# Patient Record
Sex: FEMALE | Race: WHITE | NOT HISPANIC OR LATINO | Employment: OTHER | URBAN - METROPOLITAN AREA
[De-identification: names, ages, dates, MRNs, and addresses within clinical notes are randomized per-mention and may not be internally consistent; named-entity substitution may affect disease eponyms.]

---

## 2018-02-07 ENCOUNTER — OFFICE VISIT (OUTPATIENT)
Dept: URGENT CARE | Facility: CLINIC | Age: 48
End: 2018-02-07
Payer: COMMERCIAL

## 2018-02-07 VITALS
BODY MASS INDEX: 21.68 KG/M2 | TEMPERATURE: 98.6 F | OXYGEN SATURATION: 95 % | HEART RATE: 76 BPM | SYSTOLIC BLOOD PRESSURE: 112 MMHG | RESPIRATION RATE: 14 BRPM | HEIGHT: 64 IN | WEIGHT: 127 LBS | DIASTOLIC BLOOD PRESSURE: 82 MMHG

## 2018-02-07 DIAGNOSIS — H65.192 OTHER ACUTE NONSUPPURATIVE OTITIS MEDIA OF LEFT EAR, RECURRENCE NOT SPECIFIED: ICD-10-CM

## 2018-02-07 PROCEDURE — 99203 OFFICE O/P NEW LOW 30 MIN: CPT | Performed by: NURSE PRACTITIONER

## 2018-02-07 RX ORDER — AZITHROMYCIN 250 MG/1
TABLET, FILM COATED ORAL
Qty: 6 TAB | Refills: 0 | Status: SHIPPED | OUTPATIENT
Start: 2018-02-07

## 2018-02-08 ASSESSMENT — ENCOUNTER SYMPTOMS
HEADACHES: 1
FEVER: 0

## 2018-02-09 NOTE — PROGRESS NOTES
"Subjective:      Jose Ricks is a 47 y.o. female who presents with Sinus Problem and Otalgia            Otalgia    There is pain in the left ear. This is a new problem. Episode onset: Pt reports left ear pain for almost a month now. She has been evaluated by another provider and placed on antibiotics. She reports tonight those antibiotics did not agree with her stomach so she stopped them. The problem occurs constantly. The problem has been unchanged. There has been no fever. The pain is moderate. Associated symptoms include headaches (left sided). Pertinent negatives include no ear discharge. She has tried antibiotics and NSAIDs for the symptoms. The treatment provided mild relief.       Review of Systems   Constitutional: Negative for fever.   HENT: Positive for ear pain. Negative for ear discharge.    Neurological: Positive for headaches (left sided).   All other systems reviewed and are negative.    Past Medical History:   Diagnosis Date   • Chronic pain    • Fibromyalgia    • Fibromyalgia 1/19/2011   • Hypothyroid 1/19/2011   • Sinusitis 1/19/2011      Past Surgical History:   Procedure Laterality Date   • OTHER ORTHOPEDIC SURGERY      prosthetic jaw   • OTHER ORTHOPEDIC SURGERY      cervical surgery      Social History     Social History   • Marital status:      Spouse name: N/A   • Number of children: N/A   • Years of education: N/A     Occupational History   • Not on file.     Social History Main Topics   • Smoking status: Never Smoker   • Smokeless tobacco: Never Used   • Alcohol use No   • Drug use: No   • Sexual activity: Not on file     Other Topics Concern   • Not on file     Social History Narrative   • No narrative on file          Objective:     /82   Pulse 76   Temp 37 °C (98.6 °F)   Resp 14   Ht 1.626 m (5' 4\")   Wt 57.6 kg (127 lb)   SpO2 95%   BMI 21.80 kg/m²      Physical Exam   Constitutional: She is oriented to person, place, and time. Vital signs are normal. She " appears well-developed and well-nourished.   HENT:   Head: Normocephalic and atraumatic.   Right Ear: Tympanic membrane and external ear normal.   Left Ear: External ear normal. Tympanic membrane is bulging.   Nose: Nose normal.   Cloudy fluid present behind left TM   Eyes: EOM are normal. Pupils are equal, round, and reactive to light.   Neck: Normal range of motion.   Cardiovascular: Normal rate and regular rhythm.    Pulmonary/Chest: Effort normal.   Musculoskeletal: Normal range of motion.   Lymphadenopathy:     She has no cervical adenopathy.   Neurological: She is alert and oriented to person, place, and time.   Skin: Skin is warm and dry. Capillary refill takes less than 2 seconds.   Psychiatric: She has a normal mood and affect. Her behavior is normal. Thought content normal.   Vitals reviewed.              Assessment/Plan:     1. Other acute nonsuppurative otitis media of left ear, recurrence not specified  - azithromycin (ZITHROMAX) 250 MG Tab; Take 2 tabs PO on the first day, then one tab PO daily for 4 days  Dispense: 6 Tab; Refill: 0    Will change antibiotics  Daily flonase spray for at least one week  Add in daily Zyrtec for several weeks to help with congestion and encourage pressure relief from ear  Tylenol and Ibuprofen PRN pain  Follow up with ENT  Supportive care, differential diagnoses, and indications for immediate follow-up discussed with patient.    Pathogenesis of diagnosis discussed including typical length and natural progression.      Instructed to return to  or nearest emergency department if symptoms fail to improve, for any change in condition, further concerns, or new concerning symptoms.  Patient states understanding of the plan of care and discharge instructions.

## 2018-02-22 ENCOUNTER — HOSPITAL ENCOUNTER (OUTPATIENT)
Dept: RADIOLOGY | Facility: MEDICAL CENTER | Age: 48
End: 2018-02-22
Attending: ORTHOPAEDIC SURGERY
Payer: COMMERCIAL

## 2018-02-22 DIAGNOSIS — M85.89 OTHER SPECIFIED DISORDERS OF BONE DENSITY AND STRUCTURE, MULTIPLE SITES: ICD-10-CM

## 2018-02-22 DIAGNOSIS — M54.5 BILATERAL LOW BACK PAIN, UNSPECIFIED CHRONICITY, WITH SCIATICA PRESENCE UNSPECIFIED: ICD-10-CM

## 2018-02-22 PROCEDURE — 77080 DXA BONE DENSITY AXIAL: CPT

## 2018-04-02 ENCOUNTER — APPOINTMENT (OUTPATIENT)
Dept: RADIOLOGY | Facility: MEDICAL CENTER | Age: 48
End: 2018-04-02
Attending: OTOLARYNGOLOGY
Payer: COMMERCIAL

## 2018-04-03 ENCOUNTER — HOSPITAL ENCOUNTER (OUTPATIENT)
Dept: RADIOLOGY | Facility: MEDICAL CENTER | Age: 48
End: 2018-04-03
Attending: OTOLARYNGOLOGY
Payer: COMMERCIAL

## 2018-04-03 DIAGNOSIS — R51.9 NONINTRACTABLE HEADACHE, UNSPECIFIED CHRONICITY PATTERN, UNSPECIFIED HEADACHE TYPE: ICD-10-CM

## 2018-04-03 DIAGNOSIS — J31.0 CHRONIC RHINITIS, UNSPECIFIED TYPE: ICD-10-CM

## 2018-04-03 DIAGNOSIS — J01.01 ACUTE RECURRENT MAXILLARY SINUSITIS: ICD-10-CM

## 2018-04-03 DIAGNOSIS — G50.1 ATYPICAL FACE PAIN: ICD-10-CM

## 2018-04-03 PROCEDURE — 70486 CT MAXILLOFACIAL W/O DYE: CPT

## 2018-04-10 ENCOUNTER — HOSPITAL ENCOUNTER (OUTPATIENT)
Dept: RADIOLOGY | Facility: MEDICAL CENTER | Age: 48
End: 2018-04-10

## 2018-07-10 PROCEDURE — 99284 EMERGENCY DEPT VISIT MOD MDM: CPT

## 2018-07-10 ASSESSMENT — PAIN SCALES - GENERAL
PAINLEVEL_OUTOF10: 9
PAINLEVEL_OUTOF10: 9

## 2018-07-11 ENCOUNTER — APPOINTMENT (OUTPATIENT)
Dept: RADIOLOGY | Facility: MEDICAL CENTER | Age: 48
End: 2018-07-11
Attending: EMERGENCY MEDICINE
Payer: COMMERCIAL

## 2018-07-11 ENCOUNTER — HOSPITAL ENCOUNTER (EMERGENCY)
Facility: MEDICAL CENTER | Age: 48
End: 2018-07-11
Attending: EMERGENCY MEDICINE
Payer: COMMERCIAL

## 2018-07-11 VITALS
HEART RATE: 79 BPM | WEIGHT: 130.51 LBS | BODY MASS INDEX: 22.28 KG/M2 | SYSTOLIC BLOOD PRESSURE: 119 MMHG | DIASTOLIC BLOOD PRESSURE: 79 MMHG | RESPIRATION RATE: 17 BRPM | OXYGEN SATURATION: 97 % | TEMPERATURE: 98.8 F | HEIGHT: 64 IN

## 2018-07-11 DIAGNOSIS — R68.84 JAW PAIN: ICD-10-CM

## 2018-07-11 PROCEDURE — 700102 HCHG RX REV CODE 250 W/ 637 OVERRIDE(OP): Performed by: EMERGENCY MEDICINE

## 2018-07-11 PROCEDURE — 70355 PANORAMIC X-RAY OF JAWS: CPT

## 2018-07-11 PROCEDURE — A9270 NON-COVERED ITEM OR SERVICE: HCPCS | Performed by: EMERGENCY MEDICINE

## 2018-07-11 RX ORDER — NAPROXEN 500 MG/1
500 TABLET ORAL ONCE
Status: COMPLETED | OUTPATIENT
Start: 2018-07-11 | End: 2018-07-11

## 2018-07-11 RX ORDER — NAPROXEN 500 MG/1
500 TABLET ORAL 2 TIMES DAILY WITH MEALS
Qty: 15 TAB | Refills: 0 | Status: SHIPPED | OUTPATIENT
Start: 2018-07-11

## 2018-07-11 RX ADMIN — Medication 500 MG: at 02:07

## 2018-07-11 ASSESSMENT — PAIN SCALES - GENERAL
PAINLEVEL_OUTOF10: 6
PAINLEVEL_OUTOF10: 9

## 2018-07-11 NOTE — ED TRIAGE NOTES
Break RN: patient medicated. Discharged with prescription and instruction. Verbalized understanding.

## 2018-07-11 NOTE — DISCHARGE INSTRUCTIONS
Temporomandibular Joint Pain  Your exam shows that you have a problem with your temporomandibular joint (TMJ), the joint that moves when you open your mouth or chew food. TMJ problems can result from direct injuries, bite abnormalities, or tension states which cause you to grind or clench your teeth. Typical symptoms include pain around the joint, clicking, restricted movement, and headaches.  The TMJ is like any other joint in the body; when it is strained, it needs rest to repair itself. To keep the joint at rest it is important that you do not open your mouth wider than the width of your index finger. If you must yawn, be sure to support your chin with your hand so your mouth does not open wide. Eat a soft diet (nothing firmer than ground beef, no raw vegetables), do not chew gum and do not talk if it causes you pain.  Apply topical heat by using a warm, moist cloth placed in front of the ear for 15 to 20 minutes several times daily. Alternating heat and ice may give even more relief. Anti-inflammatory pain medicine and muscle relaxants can also be helpful. A dental orthotic or splint may be used for temporary relief. Long-term problems may require treatment for stress as well as braces or surgery. Please check with your doctor or dentist if your symptoms do not improve within one week.  Document Released: 01/25/2006 Document Revised: 03/11/2013 Document Reviewed: 12/18/2006  [employer]® Patient Information ©2013 [employer].

## 2018-07-11 NOTE — ED TRIAGE NOTES
"Jose Ricks  48 y.o. female  Chief Complaint   Patient presents with   • Jaw Pain     Pt. states left sided jaw pain for the past few months. Pt. states she is seen by Dr. West for this issue post face lift. Pt. states decreased movement in that area. Pt. states hx of prosthetic jaw bilaterally. Pt. states fever and chills. Pt. states she took Percocet approx 20 min ago for pain and fever.     /81   Pulse 77   Temp 37.1 °C (98.8 °F)   Resp 16   Ht 1.626 m (5' 4\")   Wt 59.2 kg (130 lb 8.2 oz)   SpO2 97%   BMI 22.40 kg/m²      Pt amb to triage with steady gait for above complaint. Pt. States she is on multiple steroids for her s/s. Pt. In mask as she is worried about immunosupression from steroid use. Pt. Has some sores and swelling to the left cheek/ jaw area.  Pt is alert and oriented, speaking in full sentences, follows commands and responds appropriately to questions. NAD. Resp are even and unlabored.  Pt placed in lobby. Pt educated on triage process. Pt encouraged to alert staff for any changes.  "

## 2018-07-11 NOTE — ED NOTES
Took vitals on pt again. Pt has no new complaints. Apologized for the long wait time and pt understood the process. Advised pt to notify staff for any changes.

## 2018-07-11 NOTE — ED PROVIDER NOTES
"CHIEF COMPLAINT  Chief Complaint   Patient presents with   • Jaw Pain     Pt. states left sided jaw pain for the past few months. Pt. states she is seen by Dr. West for this issue post face lift. Pt. states decreased movement in that area. Pt. states hx of prosthetic jaw bilaterally. Pt. states fever and chills. Pt. states she took Percocet approx 20 min ago for pain and fever.       HPI  Jose Ricks is a 48 y.o. female who presents with left-sided jaw pain is been going on for the past few weeks.  The patient has been having some rash in the left side of her face is been going on for the past few days and she was started on steroids yesterday and now the symptoms seem to have improved.  The patient says that she has pain when opening of her mouth.  She denies any drainage out of her cheek.  She also mentions P she has been having some pain in her urine on the left side as well.  She has no decreased hearing.  She has no difficulty eating.  She has some subjective fevers and chills.    REVIEW OF SYSTEMS  See HPI for further details. All other systems are negative.     PAST MEDICAL HISTORY   has a past medical history of Chronic pain; Fibromyalgia; Fibromyalgia (1/19/2011); Hypothyroid (1/19/2011); and Sinusitis (1/19/2011).    SOCIAL HISTORY  Social History     Social History Main Topics   • Smoking status: Never Smoker   • Smokeless tobacco: Never Used   • Alcohol use No   • Drug use: No   • Sexual activity: Not on file       SURGICAL HISTORY   has a past surgical history that includes other orthopedic surgery and other orthopedic surgery.    CURRENT MEDICATIONS  Home Medications    **Home medications have not yet been reviewed for this encounter**         ALLERGIES  Allergies   Allergen Reactions   • Flagyl [Metronidazole Hcl]    • Nkda [No Known Drug Allergy]    • Vancomycin        PHYSICAL EXAM  VITAL SIGNS: /81   Pulse 82   Temp 37.3 °C (99.2 °F)   Resp 18   Ht 1.626 m (5' 4\")   Wt 59.2 kg " (130 lb 8.2 oz)   SpO2 97%   BMI 22.40 kg/m²  @GERMAN[[id number]::@  Pulse ox interpretation: I interpret this pulse ox as normal.  Constitutional: Alert in no apparent distress.  HENT: Patient with some mild redness to the auricle on the left side.  The patient has a normal TM on the left.  The patient has some scattered areas of redness and scabs noted anterior to the left ear as well as posterior to it.  The patient is able to open and close her jaw without any difficulty.  The patient has no fluctuance or redness in her mouth.  Eyes: Pupils are equal and reactive. Conjunctiva normal, non-icteric.   Heart: Regular rate and rythm, no murmurs.    Lungs: Clear to auscultation bilaterally.  Skin: Warm, Dry, No erythema, No rash.   Neurologic: Alert, Grossly non-focal.   Psychiatric: Affect normal, Judgment normal, Mood normal, Appears appropriate and not intoxicated.     [id number]   Final Result         1.  No acute traumatic bony injury identified.                COURSE & MEDICAL DECISION MAKING  Pertinent Labs & Imaging studies reviewed. (See chart for details)    48-year-old female who presents with left-sided jaw pain.  I will order a Panorex to evaluate for dislocation versus osteomyelitis.  In addition I believe this is likely related to allergies given that the patient is was recently placed on Fosamax.  She was discontinued from this medication and placed on steroids which seems to help.  I doubt this represents cellulitis however she will be following up with her plastic surgeon status post steroids and I asked her to communicate with the surgeon about this possibly being cellulitis if steroids do not effectively treat it.     At this time the patient was found to have a negative panoramic x-ray of the her mandible.  I will give her Naprosyn and have her follow-up with her plastic surgeon.  I told her to continue taking her steroids and then have the plastic surgeon evaluate lesions on the anterior  and posterior to her ear.  She is not septic appearing at this time.  She has no vital sign perturbations.  Strict return precautions were given and follow-up was arranged.    The patient will not drink alcohol nor drive with prescribed medications. The patient will return for worsening symptoms and is stable at the time of discharge. The patient verbalizes understanding and will comply.    FINAL IMPRESSION  1. Jaw pain              Electronically signed by: Flaco Jeff, 7/11/2018 12:51 AM

## 2018-07-25 ENCOUNTER — APPOINTMENT (OUTPATIENT)
Dept: RADIOLOGY | Facility: MEDICAL CENTER | Age: 48
End: 2018-07-25
Attending: OBSTETRICS & GYNECOLOGY
Payer: COMMERCIAL

## 2018-08-06 ENCOUNTER — APPOINTMENT (OUTPATIENT)
Dept: RADIOLOGY | Facility: MEDICAL CENTER | Age: 48
End: 2018-08-06
Attending: OBSTETRICS & GYNECOLOGY
Payer: COMMERCIAL

## 2018-08-16 ENCOUNTER — HOSPITAL ENCOUNTER (OUTPATIENT)
Dept: RADIOLOGY | Facility: MEDICAL CENTER | Age: 48
End: 2018-08-16
Attending: OBSTETRICS & GYNECOLOGY
Payer: COMMERCIAL

## 2018-08-16 DIAGNOSIS — Z12.39 SCREENING BREAST EXAMINATION: ICD-10-CM

## 2018-08-16 PROCEDURE — 77067 SCR MAMMO BI INCL CAD: CPT

## 2019-04-25 ENCOUNTER — APPOINTMENT (OUTPATIENT)
Dept: BEHAVIORAL HEALTH | Facility: CLINIC | Age: 49
End: 2019-04-25
Payer: COMMERCIAL

## 2019-11-26 ENCOUNTER — APPOINTMENT (RX ONLY)
Dept: URBAN - METROPOLITAN AREA CLINIC 35 | Facility: CLINIC | Age: 49
Setting detail: DERMATOLOGY
End: 2019-11-26

## 2019-11-26 DIAGNOSIS — Z41.9 ENCOUNTER FOR PROCEDURE FOR PURPOSES OTHER THAN REMEDYING HEALTH STATE, UNSPECIFIED: ICD-10-CM

## 2019-11-26 PROCEDURE — ? BOTOX

## 2019-11-26 PROCEDURE — ? ADDITIONAL NOTES

## 2019-11-26 NOTE — PROCEDURE: BOTOX
Dilution (U/0.1 Cc): 5
Additional Area 5 Units: 0
Additional Area 6 Location: platysma
Additional Area 4 Location: Bunny lines
Additional Area 2 Location: Crows Feet
Detail Level: Detailed
Reconstitution Date (Optional): 11/26/19
Additional Area 3 Location: Frontalis 10 u
Glabellar Complex Units: 15
Expiration Date (Month Year): 4/22
Lot #: [id number]
Price (Use Numbers Only, No Special Characters Or $): 300
Post-Care Instructions: Patient instructed to not lie down for 4 hours after injections and limit physical activity for 24 hours.
Additional Area 5 Location: perioral
Additional Area 1 Location: Glabella
Consent: Verbal and written informed consent were obtained to include the following risks: pain, swelling, bruising, eyelid or eyebrow droop, and lack of visible improvement of wrinkles in the areas treated.  The skin was cleansed with alcohol. Injections were administered with a 32g needle into the following areas: